# Patient Record
(demographics unavailable — no encounter records)

---

## 2025-01-25 NOTE — DISCUSSION/SUMMARY
[Normal Growth] : growth [Normal Development] : development [None] : No known medical problems [No Elimination Concerns] : elimination [No Feeding Concerns] : feeding [No Skin Concerns] : skin [Normal Sleep Pattern] : sleep [School] : school [Development and Mental Health] : development and mental health [Nutrition and Physical Activity] : nutrition and physical activity [Oral Health] : oral health [Safety] : safety [No Medications] : ~He/She~ is not on any medications [Mother] : mother [Full Activity without restrictions including Physical Education & Athletics] : Full Activity without restrictions including Physical Education & Athletics [] : The components of the vaccine(s) to be administered today are listed in the plan of care. The disease(s) for which the vaccine(s) are intended to prevent and the risks have been discussed with the caretaker.  The risks are also included in the appropriate vaccination information statements which have been provided to the patient's caregiver.  The caregiver has given consent to vaccinate. [FreeTextEntry1] : Continue balanced diet with all food groups. Brush teeth twice a day with toothbrush. Recommend visit to dentist. Help child to maintain consistent daily routines and sleep schedule. School discussed. Ensure home is safe. Teach child about personal safety. Use consistent, positive discipline. Limit screen time to no more than 2 hours per day. Encourage physical activity. Child needs to ride in a belt-positioning booster seat until  4 feet 9 inches has been reached and are between 8 and 12 years of age.  Vaccines UTD- Flu vaccine given  Coordination of care reviewed   Cardiac reviewed- no increased risk for SCD   5-2-1-0 reviewed   Passed vision and hearing screenings  Return 1 year for routine well child check or sooner if any concerns

## 2025-01-25 NOTE — HISTORY OF PRESENT ILLNESS
[Mother] : mother [Fruit] : fruit [Vegetables] : vegetables [Meat] : meat [Eggs] : eggs [Dairy] : dairy [Eats healthy meals and snacks] : eats healthy meals and snacks [Eats meals with family] : eats meals with family [Normal] : Normal [In own bed] : In own bed [Brushing teeth twice/d] : brushing teeth twice per day [Yes] : Patient goes to dentist yearly [Toothpaste] : Primary Fluoride Source: Toothpaste [Playtime (60 min/d)] : playtime 60 min a day [Participates in after-school activities] : participates in after-school activities [< 2 hrs of screen time per day] : less than 2 hrs of screen time per day [Has Friends] : has friends [Grade ___] : Grade [unfilled] [Adequate social interactions] : adequate social interactions [Adequate behavior] : adequate behavior [Adequate performance] : adequate performance [Adequate attention] : adequate attention [No difficulties with Homework] : no difficulties with homework [No] : No cigarette smoke exposure [Appropriately restrained in motor vehicle] : appropriately restrained in motor vehicle [Supervised outdoor play] : supervised outdoor play [Supervised around water] : supervised around water [Wears helmet and pads] : wears helmet and pads [Parent knows child's friends] : parent knows child's friends [Parent discusses safety rules regarding adults] : parent discusses safety rules regarding adults [Monitored computer use] : monitored computer use [Family discusses home emergency plan] : family discusses home emergency plan [Up to date] : Up to date [YES] : Yes [Are there any children in your household?] : There are children in the household. [Have you attended a firearm safety workshop or class?] : A firearm safety workshop or class has been attended. [Exposure to electronic nicotine delivery system] : No exposure to electronic nicotine delivery system [de-identified] : none [FreeTextEntry7] : 7 year old WCC Diagnosed with URI at [facility] in October 2024 after fever x 5 days, treated with Cephalexin, kidney/bladder US reportedly normal Due to history of frequent UTIs, seen by Dr. Tracy (nephrology) and Dr. Wilson's PA (urology) recently, repeat kidney/bladder US normal as per mother, no further follow up needed, mother will have records sent [de-identified] : picky eater, does not drink a lot of water, drinks juice/milk [FreeTextEntry9] : cheerleading, dance, soccer, gymnastics [Are there any unlocked firearms stored in your household?] : No unlocked firearms in the household. [Are there any firearms stored in your household that are loaded?] : No firearms are stored in the household loaded. [Has anyone in the household been feeling low/depressed/been struggling?] : No one in the household has been feeling low/depressed/been struggling.

## 2025-06-09 NOTE — HISTORY OF PRESENT ILLNESS
[FreeTextEntry6] : afebrile headache x 3 days stomach ache x 3 days Good appetite no v/d  Also dad concerned she was bit by a tick a month ago and would like some lab work for her.  Lastly she is c/o knee pain b/l knees for about a month. Pt believes it started AFTER her tick bite.  Never appears swollen or red, dad was unaware her knee was bothering her until text from mom today.  [de-identified] : sore throat x 3 days

## 2025-06-09 NOTE — DISCUSSION/SUMMARY
[FreeTextEntry1] : Symptomatic treatment of fever and/or pain discussed Stat strep test ordered Throat culture, if POSITIVE, give Amoxicillin 400mg/5ml 6ml BID x 10 days Hydrate well Handwashing and infection control discussed Return to office if febrile > 48 hours or if symptoms get worse Go to ER if unable to come to the office or during after hours, parent encouraged to call service first before doing so.  Originally had discussed waiting another 2 weeks for tick panel but in light of new onset knee pain advised to go now and will do Rheumatoid work up.  Dad with h/o NAUN as a child

## 2025-06-09 NOTE — PHYSICAL EXAM
[Erythematous Oropharynx] : erythematous oropharynx [NL] : warm, clear [de-identified] : R knee tender to palpation over R tibial tuberosity, no edema, no erythema and Full ROM b/l knees NVI

## 2025-06-30 NOTE — PHYSICAL EXAM
[TextEntry] : General: alert and active in no apparent distress, interactive Head: NCAT Eyes: conjunctiva clear, EOMI Ears: TMs translucent bilaterally, normal landmarks noted, normal canals Nose: no rhinorrhea OP: no tonsillar enlargement/exudate, no lesions, no posterior pharyngeal erythema, MMM Neck: supple, no adenopathy, no nuchal rigidity Lungs: clear to auscultation bilaterally, good air exchange, no retractions, comfortable WOB CVS: Normal rate, regular rhythm, no murmur Abdomen: mild ttp diffusely, negative McBurney's sign, soft, nondistended, and no hepatosplenomegaly or masses, normoactive bowel sounds Skin: No rashes, lesions or skin changes Neuro: no focal deficits

## 2025-06-30 NOTE — HISTORY OF PRESENT ILLNESS
[de-identified] : Vomiting, stomach pain, headaches experienced about 1 hour ago.  [FreeTextEntry6] : In addition to above: Headache, abdominal pain and vomiting started today NBNB emesis x 3, able to keep down fluids Headache has improved since onset no fevers or uri symptoms, no ST  no recent travel

## 2025-06-30 NOTE — PLAN
[TextEntry] : VIRAL INFECTION: - Discussed viral etiology and rationale for treatment - RS negative in office today - Discussed oral rehydration - Discussed concerning symptoms requiring emergent evaluation - Follow up as needed  If throat cx +, start amoxicillin 400mg/5mL at 6.5 mL BID x 10 days  I spent 31 minutes on face to face patient care, counseling and educating the patient/family/caregiver, ordering/reviewing tests